# Patient Record
Sex: FEMALE | Race: WHITE | Employment: UNEMPLOYED | URBAN - METROPOLITAN AREA
[De-identification: names, ages, dates, MRNs, and addresses within clinical notes are randomized per-mention and may not be internally consistent; named-entity substitution may affect disease eponyms.]

---

## 2021-12-14 ENCOUNTER — MEDICAL CORRESPONDENCE (OUTPATIENT)
Dept: HEALTH INFORMATION MANAGEMENT | Facility: CLINIC | Age: 26
End: 2021-12-14
Payer: COMMERCIAL

## 2021-12-15 ENCOUNTER — TRANSCRIBE ORDERS (OUTPATIENT)
Dept: OTHER | Age: 26
End: 2021-12-15

## 2021-12-15 DIAGNOSIS — A49.8 CLOSTRIDIOIDES DIFFICILE INFECTION: Primary | ICD-10-CM

## 2021-12-16 ENCOUNTER — TELEPHONE (OUTPATIENT)
Dept: GASTROENTEROLOGY | Facility: CLINIC | Age: 26
End: 2021-12-16
Payer: COMMERCIAL

## 2021-12-16 NOTE — TELEPHONE ENCOUNTER
LPN reviewed patients chart and received records from [facility] everywhere. Patient had positive C. Diff tests on 12/14/21, 10/6/21, and 6/24/21. LPN spoke with patient and patient confirmed she only had records through [facility]. LPN scheduled patient with Dr. Shelton on 2/16/2022.     Yokasta Rasmussen LPN

## 2021-12-16 NOTE — TELEPHONE ENCOUNTER
[facility] Call Center    Phone Message    May a detailed message be left on voicemail: yes     Reason for Call: Other: Patient is being referred for recurrent C. Diff whose had 3+ infections in the past year. Please review for possible FMT. Thanks!      Action Taken: Message routed to:  Adult Clinics: Gastroenterology (GI) p [phone]    Travel Screening: Not Applicable

## 2022-02-16 ENCOUNTER — OFFICE VISIT (OUTPATIENT)
Dept: GASTROENTEROLOGY | Facility: CLINIC | Age: 27
End: 2022-02-16
Payer: COMMERCIAL

## 2022-02-16 VITALS
SYSTOLIC BLOOD PRESSURE: 120 MMHG | DIASTOLIC BLOOD PRESSURE: 80 MMHG | HEART RATE: 79 BPM | OXYGEN SATURATION: 99 % | WEIGHT: 171.5 LBS

## 2022-02-16 DIAGNOSIS — A04.71 RECURRENT CLOSTRIDIOIDES DIFFICILE DIARRHEA: Primary | ICD-10-CM

## 2022-02-16 PROCEDURE — 99205 OFFICE O/P NEW HI 60 MIN: CPT | Performed by: INTERNAL MEDICINE

## 2022-02-16 RX ORDER — VANCOMYCIN HYDROCHLORIDE 125 MG/1
125 CAPSULE ORAL 4 TIMES DAILY
COMMUNITY
Start: 2022-01-03 | End: 2022-02-21

## 2022-02-16 ASSESSMENT — PAIN SCALES - GENERAL: PAINLEVEL: MODERATE PAIN (5)

## 2022-02-16 NOTE — NURSING NOTE
Merna Sanchez's goals for this visit include:   Chief Complaint   Patient presents with     Consult     Recurrent C. Diff       She requests these members of her care team be copied on today's visit information: PCP    PCP: Brenda Carter    Referring Provider:  No referring provider defined for this encounter.    /80 (BP Location: Left arm, Patient Position: Sitting, Cuff Size: Adult Regular)   Pulse 79   Wt 77.8 kg (171 lb 8 oz)   SpO2 99%     Do you need any medication refills at today's visit? No    Yokasta Rasmussen LPN

## 2022-02-16 NOTE — LETTER
2/16/2022         RE: Merna Sanchez  [address] [place] [address]        Dear Colleague,    Thank you for referring your patient, Merna Sanchez, to the [facility]. Please see a copy of my visit note below.    Detailed note was dictated.      Again, thank you for allowing me to participate in the care of your patient.        Sincerely,        Darryl Shelton MD

## 2022-02-17 ENCOUNTER — TELEPHONE (OUTPATIENT)
Dept: GASTROENTEROLOGY | Facility: CLINIC | Age: 27
End: 2022-02-17
Payer: COMMERCIAL

## 2022-02-17 DIAGNOSIS — Z11.59 ENCOUNTER FOR SCREENING FOR OTHER VIRAL DISEASES: Primary | ICD-10-CM

## 2022-02-17 NOTE — TELEPHONE ENCOUNTER
Screening Questions  Blue=prep questions Red=location Green=sedation   1. Are you active on mychart? No    2. What insurance is in the chart? [employer]      3.  Ordering/Referring Provider: Cat    4. BMI 28.3, If greater than 40 review exclusion criteria also will need EXTENDED PREP    5.  Respiratory Screening (If yes to any of the following HOSPITAL setting only):     Do you use daily home oxygen? No   Do you have mod to severe Obstructive Sleep Apnea? No  (can be seen at [facility] or hospital setting)    Do you have Pulmonary Hypertension? No    Do you have UNCONTROLLED asthma? No     6. Have you had a heart or lung transplant? No   (If yes, please review exclusion criteria)    7. Are you currently on dialysis?no   (If yes, schedule in HOSPITAL setting only)(If yes, please send Golytely prep)    8. Do you have chronic kidney disease? no  (If yes, please send Golytely prep)    9. Have you had a stroke or Transient ischemic attack (TIA) within 6 months? No  (If yes, do not schedule at [facility])    10. In the past 6 months, have you had any heart related issues including cardiomyopathy or heart attack? No  (If yes, please review exclusion criteria)           If yes, did it require cardiac stenting or other implantable device?  (If yes, please review exclusion criteria)      11. Do you have any implantable devices in your body (pacemaker, defib, LVAD)? no  (If yes, schedule at [facility])    12. Do you take nitroglycerin? If yes, how often? No  (if yes, schedule at HOSPITAL setting)    13. Are you currently taking any blood thinners?no  (If yes- inform patient to follow up with PCP or provider for follow up instructions)     14. Are you a diabetic? No  (If yes, please send Golytely prep)    15. (Females) Are you currently pregnant? No   If yes, how many weeks?      16. Are you taking any prescription pain medications on a routine schedule? No  If yes, MAC sedation and patient will need EXTENDED PREP.    17. Do you have any  chemical dependencies such as alcohol, street drugs, or methadone? No  If yes, MAC sedation     18. Do you have any history of post-traumatic stress syndrome, severe anxiety or history of psychosis? No   If yes, MAC sedation.     19. Do you transfer independently? Yes     20.  Do you have any issues with constipation?    If yes, pt will need EXTENDED PREP     21. Preferred Pharmacy for Pre Prescription [address]    Scheduling Details    Which Colonoscopy Prep was Sent?: Miralax  Type of Procedure Scheduled: colon with IMT  Surgeon: Shara  Date of Procedure: 3/7/2022  Location: [facility]  Caller (Please ask for phone number if not scheduled by patient): Merna Sanchez      Sedation Type: CS  Conscious Sedation- Needs  for 6 hours after the procedure  MAC/General-Needs  for 24 hours after procedure    Pre-op Required at [facility], [place], [place] and OR for MAC sedation: no  (if yes advise patient they will need a pre-op prior to procedure)      Informed patient they will need an adult  yes  Cannot take any type of public or medical transportation alone    Pre-Procedure Covid test to be completed at [facility] or Externally: yes at     Confirmed Nurse will call to complete assessment yes    Additional comments: no ([facility] PATIENTS NEED EXTENDED PREP)

## 2022-02-17 NOTE — PROGRESS NOTES
Visit Date: 02/16/2022    HISTORY OF PRESENT ILLNESS:  The patient is a 26-year-old woman with history of recurrent C. difficile infection.  Her first C. diff infection was in June this year.  It followed shortly treatment of sinusitis with Augmentin.  She also has history of bacterial vaginosis, although I could not find details of antibiotic treatment for that.  She was treated with vancomycin upon relapse with Dificid and she was treated with vancomycin taper and then upon relapse started on another taper.  Notably, her symptoms of bloating and abdominal discomfort increased when the dose of vancomycin dropped to 1 per day and she was increased back up to 4 times daily, which controls her symptoms better.  The patient has a longstanding history of abdominal problems dating back to her childhood.  She had several colonoscopies when she was a child and she had one in 2016 which showed normal terminal ileum and normal colon with biopsies.  She discovered over time certain foods trigger severe abdominal cramps.  These include ice cream and steak.  These cramps can wake her up during the night.  Her typical symptoms before C. diff were that she would get these abdominal pains and urges to go to the bathroom; however, she would be often unsuccessful and then when finally she would have a bowel movement, it would be explosive liquid diarrhea.    FAMILY HISTORY:  Notable for her aunt having Crohn's disease.  She has no history of surgeries.  No history of cholecystectomy, appendectomy, or any other abdominal surgeries. With her C. diff infection, she has 4-9 bowel movements per day.  She admitted to great urgency, but denied any fecal incontinence episodes. Currently on high-dose vancomycin, she is feeling near normal.    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  She is accompanied by her .    PHYSICAL EXAMINATION:  She is in no acute distress.  She asked intelligent  questions.  Skin is clear.    ASSESSMENT AND PLAN:  The patient has a clear history of recurrent C. difficile infection.  She is a reasonable candidate for microbiota transplant.  I discussed both oral capsule and colonoscopic routes of administration.  In general, we prefer the colonoscopic route if there is a diagnostic question.  In patients who are this young, the prevalence of underlying inflammatory bowel disease is significantly greater and that would be one of the reasons to favor a colonoscopic approach.  The patient also has incomplete resolution of symptoms with vancomycin that should be sufficient to keep C. difficile suppressed.  I suspect the high-dose vancomycin is treating an underlying post-infection irritable bowel syndrome.  However, long history of abdominal pain including nocturnal symptoms also raise the possibility of underlying inflammatory bowel disease.  Colonoscopic IMT is planned for 2022.    Total time spent in this visit, including review of medical records, documentation, coordination of care is 75 minutes.    Darryl Shelton MD        D: 2022   T: 2022   MT: [id number]    Name:     ZACHARY SORIAArnulfo  MRN:      [id number]        Account:    [id number]   :      1995           Visit Date: 2022     Document: [id number]

## 2022-02-28 ENCOUNTER — TELEPHONE (OUTPATIENT)
Dept: GASTROENTEROLOGY | Facility: CLINIC | Age: 27
End: 2022-02-28

## 2022-02-28 NOTE — TELEPHONE ENCOUNTER
Pre assessment questions completed for upcoming colonoscopy IMT procedure scheduled on 3/7/22    COVID test scheduled 3/4/22    Reviewed procedural arrival time 1330 and facility location [facility].    Designated  policy reviewed. Instructed to have someone stay 6 hours post procedure.     Procedure indication: c.diff    Bowel prep recommendation: Miralax/Magnesium citrate/Dulcolax     Reviewed Miralax prep instructions with patient. No fiber/iron supplements or foods that contain nuts/seeds 7 days prior to procedure.     Pt requests communication via unencrypted e-mail. This includes prep instructions.  Pt acknowledges that they have agreed to accept the risks and receive unencrypted communications for this incidence.   [email or web address]    Anticoagulation/blood thinners? no    Electronic implanted devices? No    Vanco stop 3/5/22    Patient verbalized understanding and had no questions or concerns at this time.    Brenda Bishop RN

## 2022-03-04 ENCOUNTER — LAB (OUTPATIENT)
Dept: LAB | Facility: CLINIC | Age: 27
End: 2022-03-04
Payer: COMMERCIAL

## 2022-03-04 DIAGNOSIS — Z11.59 ENCOUNTER FOR SCREENING FOR OTHER VIRAL DISEASES: ICD-10-CM

## 2022-03-04 PROCEDURE — U0003 INFECTIOUS AGENT DETECTION BY NUCLEIC ACID (DNA OR RNA); SEVERE ACUTE RESPIRATORY SYNDROME CORONAVIRUS 2 (SARS-COV-2) (CORONAVIRUS DISEASE [COVID-19]), AMPLIFIED PROBE TECHNIQUE, MAKING USE OF HIGH THROUGHPUT TECHNOLOGIES AS DESCRIBED BY CMS-2020-01-R: HCPCS

## 2022-03-04 PROCEDURE — U0005 INFEC AGEN DETEC AMPLI PROBE: HCPCS

## 2022-03-05 LAB — SARS-COV-2 RNA RESP QL NAA+PROBE: NEGATIVE

## 2022-03-07 ENCOUNTER — HOSPITAL ENCOUNTER (OUTPATIENT)
Facility: CLINIC | Age: 27
Discharge: HOME | End: 2022-03-07
Attending: INTERNAL MEDICINE | Admitting: INTERNAL MEDICINE
Payer: COMMERCIAL

## 2022-03-07 VITALS
SYSTOLIC BLOOD PRESSURE: 91 MMHG | TEMPERATURE: 98 F | HEIGHT: 65 IN | OXYGEN SATURATION: 100 % | DIASTOLIC BLOOD PRESSURE: 56 MMHG | BODY MASS INDEX: 27.14 KG/M2 | HEART RATE: 75 BPM | RESPIRATION RATE: 18 BRPM | WEIGHT: 162.92 LBS

## 2022-03-07 LAB — COLONOSCOPY: NORMAL

## 2022-03-07 PROCEDURE — G0500 MOD SEDAT ENDO SERVICE >5YRS: HCPCS | Performed by: INTERNAL MEDICINE

## 2022-03-07 PROCEDURE — 88305 TISSUE EXAM BY PATHOLOGIST: CPT | Mod: TC | Performed by: INTERNAL MEDICINE

## 2022-03-07 PROCEDURE — 88305 TISSUE EXAM BY PATHOLOGIST: CPT | Mod: 26 | Performed by: PATHOLOGY

## 2022-03-07 PROCEDURE — 99153 MOD SED SAME PHYS/QHP EA: CPT | Performed by: INTERNAL MEDICINE

## 2022-03-07 PROCEDURE — 44705 PREPARE FECAL MICROBIOTA: CPT | Performed by: INTERNAL MEDICINE

## 2022-03-07 PROCEDURE — 250N000011 HC RX IP 250 OP 636: Performed by: INTERNAL MEDICINE

## 2022-03-07 PROCEDURE — 45380 COLONOSCOPY AND BIOPSY: CPT

## 2022-03-07 RX ORDER — ONDANSETRON 2 MG/ML
4 INJECTION INTRAMUSCULAR; INTRAVENOUS EVERY 6 HOURS PRN
Status: DISCONTINUED | OUTPATIENT
Start: 2022-03-07 | End: 2022-03-14 | Stop reason: HOSPADM

## 2022-03-07 RX ORDER — NALOXONE HYDROCHLORIDE 0.4 MG/ML
0.2 INJECTION, SOLUTION INTRAMUSCULAR; INTRAVENOUS; SUBCUTANEOUS
Status: DISCONTINUED | OUTPATIENT
Start: 2022-03-07 | End: 2022-03-14 | Stop reason: HOSPADM

## 2022-03-07 RX ORDER — PROCHLORPERAZINE MALEATE 10 MG
10 TABLET ORAL EVERY 6 HOURS PRN
Status: DISCONTINUED | OUTPATIENT
Start: 2022-03-07 | End: 2022-03-14 | Stop reason: HOSPADM

## 2022-03-07 RX ORDER — ONDANSETRON 2 MG/ML
INJECTION INTRAMUSCULAR; INTRAVENOUS PRN
Status: COMPLETED | OUTPATIENT
Start: 2022-03-07 | End: 2022-03-07

## 2022-03-07 RX ORDER — FENTANYL CITRATE 50 UG/ML
INJECTION, SOLUTION INTRAMUSCULAR; INTRAVENOUS PRN
Status: COMPLETED | OUTPATIENT
Start: 2022-03-07 | End: 2022-03-07

## 2022-03-07 RX ORDER — NALOXONE HYDROCHLORIDE 0.4 MG/ML
0.4 INJECTION, SOLUTION INTRAMUSCULAR; INTRAVENOUS; SUBCUTANEOUS
Status: DISCONTINUED | OUTPATIENT
Start: 2022-03-07 | End: 2022-03-14 | Stop reason: HOSPADM

## 2022-03-07 RX ORDER — ONDANSETRON 4 MG/1
4 TABLET, ORALLY DISINTEGRATING ORAL EVERY 6 HOURS PRN
Status: DISCONTINUED | OUTPATIENT
Start: 2022-03-07 | End: 2022-03-14 | Stop reason: HOSPADM

## 2022-03-07 RX ORDER — FLUMAZENIL 0.1 MG/ML
0.2 INJECTION, SOLUTION INTRAVENOUS
Status: DISCONTINUED | OUTPATIENT
Start: 2022-03-07 | End: 2022-03-08 | Stop reason: HOSPADM

## 2022-03-07 RX ORDER — ONDANSETRON 2 MG/ML
4 INJECTION INTRAMUSCULAR; INTRAVENOUS
Status: DISCONTINUED | OUTPATIENT
Start: 2022-03-07 | End: 2022-03-07 | Stop reason: HOSPADM

## 2022-03-07 RX ORDER — LIDOCAINE 40 MG/G
CREAM TOPICAL
Status: DISCONTINUED | OUTPATIENT
Start: 2022-03-07 | End: 2022-03-07 | Stop reason: HOSPADM

## 2022-03-07 RX ADMIN — FENTANYL CITRATE 25 MCG: 50 INJECTION, SOLUTION INTRAMUSCULAR; INTRAVENOUS at 15:12

## 2022-03-07 RX ADMIN — FENTANYL CITRATE 25 MCG: 50 INJECTION, SOLUTION INTRAMUSCULAR; INTRAVENOUS at 15:07

## 2022-03-07 RX ADMIN — MIDAZOLAM 2 MG: 1 INJECTION INTRAMUSCULAR; INTRAVENOUS at 15:11

## 2022-03-07 RX ADMIN — FENTANYL CITRATE 50 MCG: 50 INJECTION, SOLUTION INTRAMUSCULAR; INTRAVENOUS at 15:05

## 2022-03-07 RX ADMIN — FENTANYL CITRATE 50 MCG: 50 INJECTION, SOLUTION INTRAMUSCULAR; INTRAVENOUS at 15:10

## 2022-03-07 RX ADMIN — MIDAZOLAM 2 MG: 1 INJECTION INTRAMUSCULAR; INTRAVENOUS at 15:07

## 2022-03-07 RX ADMIN — MIDAZOLAM 1 MG: 1 INJECTION INTRAMUSCULAR; INTRAVENOUS at 15:10

## 2022-03-07 RX ADMIN — MIDAZOLAM 2 MG: 1 INJECTION INTRAMUSCULAR; INTRAVENOUS at 05:07

## 2022-03-07 RX ADMIN — MIDAZOLAM 2 MG: 1 INJECTION INTRAMUSCULAR; INTRAVENOUS at 15:05

## 2022-03-07 RX ADMIN — ONDANSETRON 4 MG: 2 INJECTION INTRAMUSCULAR; INTRAVENOUS at 15:15

## 2022-03-07 NOTE — DISCHARGE INSTRUCTIONS
Types of Anesthesia  Your anesthesiologist is a key member of your surgical team. He or she gives you medicines (anesthetics) to keep you comfortable and decrease your awareness of surgery. He or she also monitors your condition to keep you safe during surgery. You will have 1 of 3 kinds of anesthesia during your surgery.   Monitored anesthesia care (MAC)    It's often used for surgery that is short or not too invasive.    Medicines to relax you (sedatives) are given through an IV (intravenous) line.    The area around the surgical site is usually numbed with medicine.    You may choose to stay awake or sleep lightly.    Regional anesthesia    This is sometimes called spinal epidural or valentine block.    It's often used for surgery on the arms, legs, and abdomen. It's also used during childbirth.    A specific region of your body is numbed by injecting anesthetic near nerves, near your spine, or near the operative site.    You may also be given sedatives through an IV line to relax you.    With regional anesthesia, you may choose to stay awake or sleep lightly.    General anesthesia    It's often used for extensive surgery, such as on the heart, brain, or abdomen.    It's also used when you want to be totally asleep.    It may be given as a gas that you breathe and as medicines that are injected through an IV line.    Because you are deeply sedated, you feel no pain and remember nothing of the surgery.    Risks and possible complications of anesthesia   The risks and complications of anesthesia depend on your overall health. If you are healthy, the risks are low. The risks are higher for people with heart or lung problems. Your anesthesiologist or nurse anesthetist will discuss the risks with you.   [employer] last reviewed this educational content on 9/1/2019 2000-2021 [employer]. All rights reserved. This information is not intended as a substitute for professional medical care. Always follow your  healthcare professional's instructions.          Understanding Fecal Transplant  FMT is used for treat Clostridioides difficile, an infection in the large bowel (colon) bacteria. You may also hear this infection called C. diff.  People at higher risk of C diff are those over 65 years old and those in the hospital. Anyone can get C. diff, though.  Some people keep getting C. diff after it is treated. This is called recurrent C. diff infection. This is because the healthy bacteria in the colon have a hard time coming back. FMT can  jump-start  healthy bacteria and prevent more C. diff infections.    Upsetting the balance  Normally, your large bowel has lots of healthy bacteria living in it.  The good bacteria help digest food and even help make some vitamins. These good bacteria also keep the bad bacteria from taking over.  People can get C. diff when the healthy bacteria in their colon are damaged. Most of the time, this happens after taking antibiotics. Antibiotics are helpful to fight illness, but they can also kill healthy germs in the colon.  C. diff is inside the colon of about 5 or 10 of every 100 healthy people and does not cause problems. But when the good bacteria are damaged, then C. diff can cause infection in the colon and diarrhea.  Some people with C. diff never get sick, but they can spread the infection to others.  Why FMT is done  An FMT is done to get the good bacteria in your large bowel back in balance. Your care team can take healthy bacteria from the stool of a healthy donor and give them to the person with C. diff. As a result, more good bacteria are there to balance out the bad.  How FMT is done  Before an FMT can be done, the bacteria from the donor gets tested for other infections.  The healthy bacteria from the donor are then made into a form to be used for treatment. It can be swallowed as capsules or given by way of a colonoscopy or special enema. The sample can also be given through a  tube placed through the nose and into the small intestine. You and your healthcare provider will talk about the transplant and decide which method will work best for you.  Risks of fecal transplant  Most risks of FMT are most often mild and last only a short time. These may include:    Nausea or vomiting (most often with the oral capsules)    Belly (abdominal) bloating or pain    Diarrhea or constipation  Serious risks with FMT are extremely rare. However, there have been some reports of FMT spreading antibiotic-resistant bacteria and causing infection in the [place]. It s important your medical team follows all of the testing that the FDA advises.  Other more serious risks may happen. These are often linked to how the transplant was done. For example, some risks of colonoscopy include:    Effects of sedation    Bleeding    A tear in the colon    Infection  After the procedure, call your healthcare provider if you have:    A fever of 100.4 F (38 C) or higher, or as directed by your healthcare provider    Severe belly pain or bloating    Blood in your stool or vomit    Severe vomiting  This information has been modified by your health care provider with permission from the publisher. Modified on 11/22/21.  [employer] last reviewed this educational content on 11/1/2019 2000-2019 [employer], [address] [place] [address], [place] [address]. All rights reserved. This information is not intended as a substitute for professional medical care. Always follow your healthcare professional's instructions. This information has been modified by your health care provider with permission from the publisher.

## 2022-03-07 NOTE — H&P
"[facility] Anesthesia Pre-op History and Physical    Merna Sanchez MRN# [id number]   Age: 26 year old YOB: 1995      Date of Surgery: 3/7/2022 [facility]      Date of Exam 3/7/2022 Facility (In hospital)       Home clinic: [facility]  Primary care provider: Brenda Carter         Chief Complaint and/or Reason for Procedure:   No chief complaint on file.           Active problem list:     There are no problems to display for this patient.           Medications (include herbals and vitamins):   Any Plavix use in the last 7 days? No     Current Facility-Administered Medications   Medication     lidocaine (LMX4) cream     lidocaine 1 % 0.1-1 mL     ondansetron (ZOFRAN) injection 4 mg     sodium chloride (PF) 0.9% PF flush 3 mL     sodium chloride (PF) 0.9% PF flush 3 mL             Allergies:      Allergies   Allergen Reactions     Sertraline Nausea and Vomiting     Increase anxiety symptoms     Allergy to Latex? No  Allergy to tape?   No  Intolerances: None            Physical Exam:   All vitals have been reviewed  Patient Vitals for the past 8 hrs:   BP Temp Temp src Pulse Resp SpO2 Height Weight   03/07/22 1402 120/79 97.6  F (36.4  C) Oral 69 16 100 % 1.651 m (5' 5\") 73.9 kg (162 lb 14.7 oz)            Lab / Radiology Results:            Anesthetic risk and/or ASA classification:   ASA 2.    Darryl Shelton MD     "

## 2022-03-08 LAB
PATH REPORT.COMMENTS IMP SPEC: NORMAL
PATH REPORT.COMMENTS IMP SPEC: NORMAL
PATH REPORT.FINAL DX SPEC: NORMAL
PATH REPORT.GROSS SPEC: NORMAL
PATH REPORT.MICROSCOPIC SPEC OTHER STN: NORMAL
PATH REPORT.RELEVANT HX SPEC: NORMAL
PHOTO IMAGE: NORMAL

## 2022-03-17 NOTE — TELEPHONE ENCOUNTER
REFERRAL INFORMATION:    Referring Provider:  Martine Sanches PA-C     Referring Clinic:  [facility]    Reason for Visit/Diagnosis: C. Diff     FUTURE VISIT INFORMATION:    Appointment Date: 4/5/2022    Appointment Time: 3 PM      NOTES STATUS DETAILS   OFFICE NOTE from Referring Provider Care Everywhere 12/14/2021 Office visit with Martine Sanches PA-C     OFFICE NOTE from Other Specialist Internal/ Care Everywhere 2/16/2022 Office visit with Dr. Darryl Shelton ([facility])     12/2/15 Office visit with SARATH Walker CNP ([facility])        HOSPITAL DISCHARGE SUMMARY/  ED VISITS N/A    OPERATIVE REPORT N/A    MEDICATION LIST N/A         ENDOSCOPY  N/A    COLONOSCOPY Care Everywhere/ Internal 1/6/16 ([facility])   3/7/2022   ERCP N/A    EUS N/A    STOOL TESTING Care Everywhere 12/14/2021, 6/24/2021, 2/28/2020, 12/19/18   PERTINENT LABS Internal    PATHOLOGY REPORTS (RELATED) Internal 3/7/2022   IMAGING (CT, MRI, EGD, MRCP, Small Bowel Follow Through/SBT, MR/CT Enterography) N/A

## 2022-04-03 ASSESSMENT — ENCOUNTER SYMPTOMS
HEARTBURN: 1
BLOOD IN STOOL: 0
NAUSEA: 1
VOMITING: 0
RECTAL PAIN: 1
BLOATING: 1
DIARRHEA: 1
ABDOMINAL PAIN: 1
CONSTIPATION: 0
BOWEL INCONTINENCE: 0
JAUNDICE: 0

## 2022-04-05 ENCOUNTER — VIRTUAL VISIT (OUTPATIENT)
Dept: GASTROENTEROLOGY | Facility: CLINIC | Age: 27
End: 2022-04-05
Payer: COMMERCIAL

## 2022-04-05 ENCOUNTER — PRE VISIT (OUTPATIENT)
Dept: GASTROENTEROLOGY | Facility: CLINIC | Age: 27
End: 2022-04-05
Payer: COMMERCIAL

## 2022-04-05 DIAGNOSIS — A04.71 RECURRENT CLOSTRIDIOIDES DIFFICILE DIARRHEA: Primary | ICD-10-CM

## 2022-04-05 PROCEDURE — 99215 OFFICE O/P EST HI 40 MIN: CPT | Mod: GT | Performed by: PHYSICIAN ASSISTANT

## 2022-04-05 NOTE — PROGRESS NOTES
Merna is a 27 year old who is being evaluated via a billable video visit.      How would you like to obtain your AVS? MyChart  If the video visit is dropped, the invitation should be resent by: Send to e-mail at: [email or web address]  Will anyone else be joining your video visit? No      Video Start Time: 251  Video-Visit Details    Type of service:  Video Visit    Video End Time:3:17 PM    Originating Location (pt. Location): Home    Distant Location (provider location):  [facility] [place]     Platform used for Video Visit: MobileAware    GI CLINIC VISIT    CC/REFERRING PROVIDER: Referred Self    HPI: 27 year old female presenting to GI clinic for follow-up of recurrent Clostridium difficile infection s/p intestinal microbiota transplant (IMT).    Merna developed recurrent C difficile infectins following an index infection after a course of Augmentin, as well as two courses of metronidazole for BV. With C diff infection, she reported 4-9 watery stools per day, associated with great urgency. While on oral vancomycin, she felt near normal.  Prior to CDI, she had a long history of abdominal symptoms, described as cramping with certain food triggers, originiating in childhood, with unremarkable colonoscopy in 2016.     She underwent colonoscopy with administration of IMT 3/7/2022. Following IMT, her stools are now formed, however she has developed gas and bloating symptoms, as well as fecal urgency, which tend to closely correlate to particular dietary triggers. Triggers include dairy, sugary foods, steak. If she avoids these, she feels better. She has not had any recurrent antibiotic exposures.    ROS: 10pt ROS performed and otherwise negative.    PAST MEDICAL HISTORY:  No past medical history on file.    PREVIOUS ABDOMINAL/GYNECOLOGIC SURGERIES:    Past Surgical History:   Procedure Laterality Date     COLONOSCOPY, FECAL TRANSPLANT N/A 3/7/2022    Procedure: COLONOSCOPY, WITH  INTESTINAL MICROBIOTA TRANSFER;  Surgeon: Darryl Shelton MD;  Location: [facility]         PERTINENT MEDICATIONS:  No current outpatient medications on file.     SOCIAL HISTORY:  Social History     Socioeconomic History     Marital status: Single     Spouse name: Not on file     Number of children: Not on file     Years of education: Not on file     Highest education level: Not on file   Occupational History     Not on file   Tobacco Use     Smoking status: Never Smoker     Smokeless tobacco: Never Used   Substance and Sexual Activity     Alcohol use: Not on file     Drug use: Not on file     Sexual activity: Not on file   Other Topics Concern     Not on file   Social History Narrative     Not on file     Social Determinants of Health     Financial Resource Strain: Not on file   Food Insecurity: Not on file   Transportation Needs: Not on file   Physical Activity: Not on file   Stress: Not on file   Social Connections: Not on file   Intimate Partner Violence: Not on file   Housing Stability: Not on file       FAMILY HISTORY:  No family history on file.    PHYSICAL EXAMINATION:  Vitals reviewed  There were no vitals taken for this visit.  Video physical exam  General: Patient appears well in no acute distress.   Skin: No visualized rash or lesions on visualized skin  Eyes: EOMI, no erythema, sclera icterus or discharge noted  Resp: Appears to be breathing comfortably without accessory muscle usage, speaking in full sentences, no cough  MSK: Appears to have normal range of motion based on visualized movements  Neurologic: No apparent tremors, facial movements symmetric  Psych: affect normal, alert and oriented    The rest of a comprehensive physical examination is deferred due to PHE (public health emergency) video restrictions      PERTINENT STUDIES Reviewed in EMR    ASSESSMENT/PLAN:    # Recurrent CDI s/p IMT  Merna is about one month out from IMT delivered via colonoscopy for recurrent CDI. The diarrhea has  resolved, however she is experiencing gas, bloating, and fecal urgency, which closely correlate to intake of dietary triggers including dairy and sugar. We discussed that these symptoms may represent post-C diff irritable bowel syndrome and/or symptoms of IMT. We reviewed a post-IMT diet, as well as initiation of a soluble fiber supplement. She is not interested in meeting with Dr. Garcia, our dietician, at this time.    We reviewed that after two months post-IMT, we would consider her cured of C diff and would not expect a spontaneous recurrence. We discussed the increased risk of C difficile with antibiotic exposures, and the importance of notifying our cliniic with any anticipated antibiotic needs or exposures.      Thank you for this consultation. It was a pleasure to participate in the care of this patient; please contact us with any further questions.    Willow Hathaway PA-C    41 minutes spent on the date of the encounter doing chart review, review of outside records, review of test results, patient visit and documentation

## 2022-04-05 NOTE — PATIENT INSTRUCTIONS
"It was a pleasure taking care of you today.  I've included a brief summary of our discussion and care plan from today's visit below.  Please review this information with your primary care provider.  _______________________________________________________________________    My recommendations are summarized as follows:    -- Refer to post-IMT diet. [email or web address]   -- If the above link does not work, in your search browser, type \"[place] [employer], click the \"patients\" tab, and then click \"post-IMT diet\". Dairy and sugar are common triggers for gas and bloating following an IMT.  -- Consider starting a powdered fiber supplement.  When used on a daily basis, this can help regulate the consistency of your stools. Metamucil (psyllium) and Citrucel are preferred examples. You can start with 1-2 teaspoons per day, with goal to gradually increase to 1 tablespoon daily. You can increase up to 1 tablespoon three times daily if needed. It is important to stay well-hydrated with use of fiber supplementation and to make sure that the fiber powder is well mixed with water as directed on the label. You may experience some bloating with initiation of fiber, which will improve over the first few weeks. A good trial to evaluate the effect is 3-6 months. Of note, many of the fiber products contain artificial sweeteners, which can cause bloating, gas, and diarrhea in those who may be sensitive to artificial sweeteners. If this is the case, recommend trying Metamucil Premium Blend (with Stevia), Metamucil 4-in-1 without Added Sweeteners, or Bellway (sweetened with Monk fruit extract).  -- Keep us updated of any antibiotic needs or exposures    Return to GI Clinic in 4-5 months to review your progress, sooner if needed.    ______________________________________________________________________    How do I schedule labs, imaging studies, or procedures that were ordered in clinic " today?     Labs: To schedule lab appointment at the [facility], use my chart or call [phone]. If you have a [place] lab closer to home where you are regularly seen you can give them a call.     Procedures: If a colonoscopy, upper endoscopy, breath test, esophageal manometry, or pH impedence was ordered today, our endoscopy team will call you to schedule this. If you have not heard from our endoscopy team within a week, please call [phone] to schedule.     Imaging Studies: If you were scheduled for a CT scan, X-ray, MRI, ultrasound, HIDA scan or other imaging study, please call [phone] to have this scheduled.     Referral: If a referral to another specialty was ordered, expect a phone call or follow instructions above. If you have not heard from anyone regarding your referral in a week, please call our clinic to check the status.     Who do I call with any questions after my visit?  Please be in touch if there are any further questions that arise following today's visit.  There are multiple ways to contact your gastroenterology care team.        During business hours, you may reach a Gastroenterology nurse at [phone]      To schedule or reschedule an appointment, please call [phone].       You can always send a secure message through [employer].  [employer]ipe messages are answered by your nurse or doctor typically within 24 hours.  Please allow extra time on weekends and holidays.        For urgent/emergent questions after business hours, you may reach the on-call GI Fellow by contacting the [facility]  at [phone].     How will I get the results of any tests ordered?    You will receive all of your results.  If you have signed up for [employer], any tests ordered at your visit will be available to you after your physician reviews them.  Typically this takes 1-2 weeks.  If there are urgent results that require a change in your care plan, your physician or nurse  will call you to discuss the next steps.      What is treadalonghart?  Nor1 is a secure way for you to access all of your healthcare records from the [facility].  It is a web based computer program, so you can sign on to it from any location.  It also allows you to send secure messages to your care team.  I recommend signing up for Nor1 access if you have not already done so and are comfortable with using a computer.      How to I schedule a follow-up visit?  If you did not schedule a follow-up visit today, please call [phone] to schedule a follow-up office visit.      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  We know from published literature and our own extensive experience that IMT recipients remain at very high risk (~20%) of C. difficile re-infection with new antibiotic provocations, and these new infections may be just as difficult to eradicate.  This risk needs to be considered in IMT patients.  Please do not hesitate to contact to discuss antibiotics management. You can reach me via pager through the [facility] [phone] or directly at [phone].      The following situations are common:    1. Bladder infections.  It is important that only symptomatic bladder infections are treated.  All oral antibiotics, including macrodantin and fosfomycin, risk C. difficile re-infection.  Our generally protocol for uncomplicated UTIs (Lion HENSLEY, et al., J Antimicrob Chemother, 2016) uses intramuscular gentamicin x 3 days.  Aminoglycoside antibiotics do not cross into the gut lumen and leave gut microbes intact.  We stock gentamicin in our clinic and can administer  it on short notice.  Obviously, it is important that urine culture (preferably catheterized specimen) is obtained prior to antibiotic treatment.  Non-antibiotic prophylactic approaches are needed in patients with recurrent UTIs (> 3 episodes/year).  2. Surgical prophylaxis.  Patients generally can receive IV vancomycin to cover Gram-positives for surgical prophylaxis.  Aminoglycosides given IV or IM can provide Gram-negative coverage.  Both of these antibiotics have very minimal penetration into the gut lumen and do not risk C. difficile reinfection.  3. Dental procedure prophylaxis.  Most routine dental care does not require prophylaxis with antibiotics.  That includes patients with prosthetic joints where there is uniform agreement from all professional societies that there is no benefit from antibiotic prophylaxis.    Please note, any decisions to add prophylactic oral vancomycin need to be coordinated with our office. That decision is equivalent to two possible outcomes: (1) the patient can stay on oral vancomycin indefinitely or (2) receive another IMT treatment. Merely giving oral vancomycin for the duration of the other antibiotic treatment does not prevent relapse of C. difficile infection once vancomycin is discontinued.    Obviously, antibiotic management is a difficult challenge in medicine.  Antibiotics can be lifesaving and should not be avoided when needed.  Also, pharmacologic considerations can further inform choices and different antibiotics can be stratified according to C. difficile infection risk. Please do not hesitate to call me to discuss further.

## 2022-04-05 NOTE — LETTER
4/5/2022         RE: Merna Sanchez  [address] [place] [address]        Dear Colleague,    Thank you for referring your patient, Merna Sanchez, to the [facility]STROENTEROLOGY [facility]INIC [place]. Please see a copy of my visit note below.      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    Merna developed recurrent C difficile infectins following an index infection after a course of Augmentin, as well as two courses of metronidazole for BV. With C diff infection, she reported 4-9 watery stools per day, associated with great urgency. While on oral vancomycin, she felt near normal.  Prior to CDI, she had a long history of abdominal symptoms, described as cramping with certain food triggers, originiating in childhood, with unremarkable colonoscopy in 2016.     She underwent colonoscopy with administration of IMT 3/7/2022. Following IMT, her stools are now formed, however she has developed gas and bloating symptoms, as well as fecal urgency, which tend to closely correlate to particular dietary triggers. Triggers include dairy, sugary foods, steak. If she avoids these, she feels better. She has not had any recurrent antibiotic exposures.    ROS: 10pt ROS performed and otherwise negative.    PAST MEDICAL HISTORY:  No past medical history on file.    PREVIOUS ABDOMINAL/GYNECOLOGIC SURGERIES:    Past Surgical History:   Procedure Laterality Date     COLONOSCOPY, FECAL TRANSPLANT N/A 3/7/2022    Procedure: COLONOSCOPY, WITH INTESTINAL MICROBIOTA TRANSFER;  Surgeon: Darryl Shelton MD;  Location:  GI         PERTINENT MEDICATIONS:  No current outpatient medications on file.     SOCIAL HISTORY:  Social History     Socioeconomic History     Marital status: Single     Spouse name: Not on file     Number of  children: Not on file     Years of education: Not on file     Highest education level: Not on file   Occupational History     Not on file   Tobacco Use     Smoking status: Never Smoker     Smokeless tobacco: Never Used   Substance and Sexual Activity     Alcohol use: Not on file     Drug use: Not on file     Sexual activity: Not on file   Other Topics Concern     Not on file   Social History Narrative     Not on file     Social Determinants of Health     Financial Resource Strain: Not on file   Food Insecurity: Not on file   Transportation Needs: Not on file   Physical Activity: Not on file   Stress: Not on file   Social Connections: Not on file   Intimate Partner Violence: Not on file   Housing Stability: Not on file       FAMILY HISTORY:  No family history on file.    PHYSICAL EXAMINATION:  Vitals reviewed  There were no vitals taken for this visit.  Video physical exam  General: Patient appears well in no acute distress.   Skin: No visualized rash or lesions on visualized skin  Eyes: EOMI, no erythema, sclera icterus or discharge noted  Resp: Appears to be breathing comfortably without accessory muscle usage, speaking in full sentences, no cough  MSK: Appears to have normal range of motion based on visualized movements  Neurologic: No apparent tremors, facial movements symmetric  Psych: affect normal, alert and oriented    The rest of a comprehensive physical examination is deferred due to PHE (public health emergency) video restrictions      PERTINENT STUDIES Reviewed in EMR    ASSESSMENT/PLAN:    # Recurrent CDI s/p IMT  Merna is about one month out from IMT delivered via colonoscopy for recurrent CDI. The diarrhea has resolved, however she is experiencing gas, bloating, and fecal urgency, which closely correlate to intake of dietary triggers including dairy and sugar. We discussed that these symptoms may represent post-C diff irritable bowel syndrome and/or symptoms of IMT. We reviewed a post-IMT diet, as  well as initiation of a soluble fiber supplement. She is not interested in meeting with Dr. Garcia, our dietician, at this time.    We reviewed that after two months post-IMT, we would consider her cured of C diff and would not expect a spontaneous recurrence. We discussed the increased risk of C difficile with antibiotic exposures, and the importance of notifying our cliniic with any anticipated antibiotic needs or exposures.      Thank you for this consultation. It was a pleasure to participate in the care of this patient; please contact us with any further questions.      Willow Hathaway PA-C      41 minutes spent on the date of the encounter doing chart review, review of outside records, review of test results, patient visit and documentation

## 2022-05-01 ENCOUNTER — HEALTH MAINTENANCE LETTER (OUTPATIENT)
Age: 27
End: 2022-05-01

## 2022-08-05 ENCOUNTER — VIRTUAL VISIT (OUTPATIENT)
Dept: GASTROENTEROLOGY | Facility: CLINIC | Age: 27
End: 2022-08-05
Payer: COMMERCIAL

## 2022-08-05 DIAGNOSIS — R14.0 BLOATING: ICD-10-CM

## 2022-08-05 DIAGNOSIS — A04.71 RECURRENT CLOSTRIDIOIDES DIFFICILE DIARRHEA: Primary | ICD-10-CM

## 2022-08-05 PROCEDURE — 99213 OFFICE O/P EST LOW 20 MIN: CPT | Performed by: PHYSICIAN ASSISTANT

## 2022-08-05 NOTE — LETTER
"    8/5/2022         RE: Merna Sanchez  [address] [place] [address]        Dear Colleague,    Thank you for referring your patient, Merna Sanchez, to the [facility] GASTROENTEROLOGY CLINIC [place]. Please see a copy of my visit note below.    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    Merna \"Anitha\" developed recurrent C difficile infections following  index infection after a course of Augmentin, as well as two courses of metronidazole for BV. With C diff infection, she reported 4-9 watery stools per day, associated with great urgency. While on oral vancomycin, she felt near normal.  Prior to CDI, she had a long history of abdominal symptoms, described as cramping with certain food triggers, originiating in childhood, with unremarkable colonoscopy in 2016.     She underwent colonoscopy with administration of IMT 3/7/2022. Following IMT, she had resolution of diarrhea, however noted increased gas and bloating symptoms, as well as fecal urgency, correlating to dietary triggers (ie dairy, sugary foods, steak).  Anitha works in [employer].    Interval history:  Anitha had onset of diarrhea May 2022. C diff testing was negative, and she then tested positive for COVID-19 two days later. The diarrhea then resolved and she is now back to having 1-2 formed stools daily without associated concerns. She still may develop increased gas, bloating, and/or loose stools with ingestion of the dietary triggers as noted above in HPI. She may also experience loose stools in conjunction with menstrual cycle. No other concerns today.    No further antibiotic exposures.    ROS: 10pt ROS performed and otherwise negative.    PAST MEDICAL HISTORY:  No past medical history on file.    PREVIOUS ABDOMINAL/GYNECOLOGIC SURGERIES:    Past " Surgical History:   Procedure Laterality Date     COLONOSCOPY, FECAL TRANSPLANT N/A 3/7/2022    Procedure: COLONOSCOPY, WITH INTESTINAL MICROBIOTA TRANSFER;  Surgeon: Darryl Shelton MD;  Location: [facility]         PERTINENT MEDICATIONS:  No current outpatient medications on file.     SOCIAL HISTORY:  Social History     Socioeconomic History     Marital status: Single     Spouse name: Not on file     Number of children: Not on file     Years of education: Not on file     Highest education level: Not on file   Occupational History     Not on file   Tobacco Use     Smoking status: Never Smoker     Smokeless tobacco: Never Used   Substance and Sexual Activity     Alcohol use: Not on file     Drug use: Not on file     Sexual activity: Not on file   Other Topics Concern     Not on file   Social History Narrative     Not on file     Social Determinants of Health     Financial Resource Strain: Not on file   Food Insecurity: Not on file   Transportation Needs: Not on file   Physical Activity: Not on file   Stress: Not on file   Social Connections: Not on file   Intimate Partner Violence: Not on file   Housing Stability: Not on file       FAMILY HISTORY:  No family history on file.      PERTINENT STUDIES Reviewed in EMR    ASSESSMENT/PLAN:    # Recurrent CDI s/p IMT  Anitha is s/p IMT delivered via colonoscopy, performed March 2022 for recurrent CDI, with resolution of diarrhea. She continues to have intermittent gas, bloating, and/or loose stools in conjunction with menstrual cycle, and following ingestion of dietary triggers, including dairy, sugar, and red meat, which may represent post-C diff irritable bowel syndrome versus pre-existing DGBI given similar symptoms prior to CDI. We reviewed increasing dietary fiber and starting a fiber supplement, if needed, as well as enteric-coated peppermint.    We reviewed that at this juncture, we would consider her cured of C diff and would not expect a spontaneous recurrence. We  discussed the increased risk of C difficile with any future antibiotic exposures, and the importance of notifying our cliniic with any anticipated antibiotic needs or exposures.      Thank you for this consultation. It was a pleasure to participate in the care of this patient; please contact us with any further questions.    Willow Hathaway PA-C    24 minutes spent on the date of the encounter doing chart review, review of outside records, review of test results, patient visit and documentation

## 2022-08-05 NOTE — PROGRESS NOTES
"Merna is a 27 year old who is being evaluated via a billable telephone visit.      What phone number would you like to be contacted at? [phone]  How would you like to obtain your AVS? [place]  Phone call duration: 12 minutes    GI CLINIC VISIT    CC/REFERRING PROVIDER: Referred Self    HPI: 27 year old female presenting to GI clinic for follow-up of recurrent Clostridium difficile infection s/p intestinal microbiota transplant (IMT).    Merna \"Anitha\" developed recurrent C difficile infections following  index infection after a course of Augmentin, as well as two courses of metronidazole for BV. With C diff infection, she reported 4-9 watery stools per day, associated with great urgency. While on oral vancomycin, she felt near normal.  Prior to CDI, she had a long history of abdominal symptoms, described as cramping with certain food triggers, originiating in childhood, with unremarkable colonoscopy in 2016.     She underwent colonoscopy with administration of IMT 3/7/2022. Following IMT, she had resolution of diarrhea, however noted increased gas and bloating symptoms, as well as fecal urgency, correlating to dietary triggers (ie dairy, sugary foods, steak).  Anitha works in [employer].    Interval history:  Anitha had onset of diarrhea May 2022. C diff testing was negative, and she then tested positive for COVID-19 two days later. The diarrhea then resolved and she is now back to having 1-2 formed stools daily without associated concerns. She still may develop increased gas, bloating, and/or loose stools with ingestion of the dietary triggers as noted above in HPI. She may also experience loose stools in conjunction with menstrual cycle. No other concerns today.    No further antibiotic exposures.    ROS: 10pt ROS performed and otherwise negative.    PAST MEDICAL HISTORY:  No past medical history on file.    PREVIOUS ABDOMINAL/GYNECOLOGIC SURGERIES:    Past Surgical History:   Procedure Laterality Date     " COLONOSCOPY, FECAL TRANSPLANT N/A 3/7/2022    Procedure: COLONOSCOPY, WITH INTESTINAL MICROBIOTA TRANSFER;  Surgeon: Darryl Shelton MD;  Location: [facility]         PERTINENT MEDICATIONS:  No current outpatient medications on file.     SOCIAL HISTORY:  Social History     Socioeconomic History     Marital status: Single     Spouse name: Not on file     Number of children: Not on file     Years of education: Not on file     Highest education level: Not on file   Occupational History     Not on file   Tobacco Use     Smoking status: Never Smoker     Smokeless tobacco: Never Used   Substance and Sexual Activity     Alcohol use: Not on file     Drug use: Not on file     Sexual activity: Not on file   Other Topics Concern     Not on file   Social History Narrative     Not on file     Social Determinants of Health     Financial Resource Strain: Not on file   Food Insecurity: Not on file   Transportation Needs: Not on file   Physical Activity: Not on file   Stress: Not on file   Social Connections: Not on file   Intimate Partner Violence: Not on file   Housing Stability: Not on file       FAMILY HISTORY:  No family history on file.      PERTINENT STUDIES Reviewed in EMR    ASSESSMENT/PLAN:    # Recurrent CDI s/p IMT  Anitha is s/p IMT delivered via colonoscopy, performed March 2022 for recurrent CDI, with resolution of diarrhea. She continues to have intermittent gas, bloating, and/or loose stools in conjunction with menstrual cycle, and following ingestion of dietary triggers, including dairy, sugar, and red meat, which may represent post-C diff irritable bowel syndrome versus pre-existing DGBI given similar symptoms prior to CDI. We reviewed increasing dietary fiber and starting a fiber supplement, if needed, as well as enteric-coated peppermint.    We reviewed that at this juncture, we would consider her cured of C diff and would not expect a spontaneous recurrence. We discussed the increased risk of C difficile with any  future antibiotic exposures, and the importance of notifying our cliniic with any anticipated antibiotic needs or exposures.      Thank you for this consultation. It was a pleasure to participate in the care of this patient; please contact us with any further questions.    Willow Hathaway PA-C    24 minutes spent on the date of the encounter doing chart review, review of outside records, review of test results, patient visit and documentation

## 2022-08-05 NOTE — PATIENT INSTRUCTIONS
"It was a pleasure taking care of you today.  I've included a brief summary of our discussion and care plan from today's visit below.  Please review this information with your primary care provider.  _______________________________________________________________________    My recommendations are summarized as follows:  -- Goal 25-30 grams of fiber in the diet daily  -- Recommend starting supplementation with a powdered soluble fiber. When used on a daily basis, this can help regulate the consistency of your stools. Metamucil (psyllium) and Citrucel are preferred examples. You can start with 1-2 teaspoons per day, with goal to gradually increase to 1 tablespoon daily. You can increase up to 1 tablespoon three times daily if needed. It is important to stay well-hydrated with use of fiber supplementation and to make sure that the fiber powder is well mixed with water as directed on the label. You may experience some bloating with initiation of fiber, which will improve over the first few weeks. A good trial to evaluate the effect is 3-6 months. Of note, many of the fiber products contain artificial sweeteners, which can cause bloating, gas, and diarrhea in those who may be sensitive to artificial sweeteners. If this is the case, recommend trying Metamucil Premium Blend (with Stevia), Metamucil 4-in-1 without Added Sweeteners, or Bellway (sweetened with Monk fruit extract).  -- For bloating/gas/loose stool symptom, you cna also trial enteric-coated peppermint. This is sold as \"IBGard\" or as generic capsules which you can find on [employer].  -- Keep us updated should you be prescribed antibiotics        Return to GI Clinic in 6 months to review your progress.    ______________________________________________________________________    How do I schedule labs, imaging studies, or procedures that were ordered in clinic today?     Labs: To schedule lab appointment at [facility], use my chart or call " [phone]. If you have a [facility] lab closer to home where you are regularly seen you can give them a call.     Procedures: If a colonoscopy, upper endoscopy, breath test, esophageal manometry, or pH impedence was ordered today, our endoscopy team will call you to schedule this. If you have not heard from our endoscopy team within a week, please call [phone] to schedule.     Imaging Studies: If you were scheduled for a CT scan, X-ray, MRI, ultrasound, HIDA scan or other imaging study, please call [phone] to have this scheduled.     Referral: If a referral to another specialty was ordered, expect a phone call or follow instructions above. If you have not heard from anyone regarding your referral in a week, please call our clinic to check the status.     Who do I call with any questions after my visit?  Please be in touch if there are any further questions that arise following today's visit.  There are multiple ways to contact your gastroenterology care team.      During business hours, you may reach a Gastroenterology nurse at [phone]    To schedule or reschedule an appointment, please call [phone].     You can always send a secure message through [employer].  [employer] messages are answered by your nurse or doctor typically within 24 hours.  Please allow extra time on weekends and holidays.      For urgent/emergent questions after business hours, you may reach the on-call GI Fellow by contacting the [facility]  at [phone].     How will I get the results of any tests ordered?    You will receive all of your results.  If you have signed up for [employer], any tests ordered at your visit will be available to you after your physician reviews them.  Typically this takes 1-2 weeks.  If there are urgent results that require a change in your care plan, your physician or nurse will call you to discuss the next steps.      What is [employer]?  [employer] is a secure way for you to access all  of your healthcare records from the [facility].  It is a web based computer program, so you can sign on to it from any location.  It also allows you to send secure messages to your care team.  I recommend signing up for [employer] access if you have not already done so and are comfortable with using a computer.      How to I schedule a follow-up visit?  If you did not schedule a follow-up visit today, please call [phone] to schedule a follow-up office visit.      Sincerely,    Willow Hathaway PA-C  Division of Gastroenterology, Hepatology & Nutrition  [facility]

## 2022-08-09 ENCOUNTER — TELEPHONE (OUTPATIENT)
Dept: GASTROENTEROLOGY | Facility: CLINIC | Age: 27
End: 2022-08-09

## 2022-11-21 ENCOUNTER — HEALTH MAINTENANCE LETTER (OUTPATIENT)
Age: 27
End: 2022-11-21

## 2023-09-17 ENCOUNTER — HEALTH MAINTENANCE LETTER (OUTPATIENT)
Age: 28
End: 2023-09-17

## 2024-11-09 ENCOUNTER — HEALTH MAINTENANCE LETTER (OUTPATIENT)
Age: 29
End: 2024-11-09

## (undated) RX ORDER — FENTANYL CITRATE 50 UG/ML
INJECTION, SOLUTION INTRAMUSCULAR; INTRAVENOUS
Status: DISPENSED
Start: 2022-03-07

## (undated) RX ORDER — ONDANSETRON 2 MG/ML
INJECTION INTRAMUSCULAR; INTRAVENOUS
Status: DISPENSED
Start: 2022-03-07